# Patient Record
Sex: FEMALE | Race: WHITE
[De-identification: names, ages, dates, MRNs, and addresses within clinical notes are randomized per-mention and may not be internally consistent; named-entity substitution may affect disease eponyms.]

---

## 2017-12-31 ENCOUNTER — HOSPITAL ENCOUNTER (EMERGENCY)
Dept: HOSPITAL 92 - ERS | Age: 77
Discharge: HOME | End: 2017-12-31
Payer: MEDICARE

## 2017-12-31 DIAGNOSIS — J11.1: Primary | ICD-10-CM

## 2017-12-31 DIAGNOSIS — J44.9: ICD-10-CM

## 2017-12-31 DIAGNOSIS — I10: ICD-10-CM

## 2017-12-31 LAB
ALBUMIN SERPL BCG-MCNC: 3.8 G/DL (ref 3.4–4.8)
ALP SERPL-CCNC: 66 U/L (ref 40–150)
ALT SERPL W P-5'-P-CCNC: 7 U/L (ref 8–55)
ANION GAP SERPL CALC-SCNC: 16 MMOL/L (ref 10–20)
AST SERPL-CCNC: 15 U/L (ref 5–34)
BASOPHILS # BLD AUTO: 0 THOU/UL (ref 0–0.2)
BASOPHILS NFR BLD AUTO: 0.5 % (ref 0–1)
BILIRUB SERPL-MCNC: 0.4 MG/DL (ref 0.2–1.2)
BUN SERPL-MCNC: 29 MG/DL (ref 9.8–20.1)
CALCIUM SERPL-MCNC: 8.9 MG/DL (ref 7.8–10.44)
CHLORIDE SERPL-SCNC: 104 MMOL/L (ref 98–107)
CK MB SERPL-MCNC: 0.6 NG/ML (ref 0–6.6)
CK SERPL-CCNC: 182 U/L (ref 29–168)
CO2 SERPL-SCNC: 24 MMOL/L (ref 23–31)
CREAT CL PREDICTED SERPL C-G-VRATE: 0 ML/MIN (ref 70–130)
EOSINOPHIL # BLD AUTO: 0 THOU/UL (ref 0–0.7)
EOSINOPHIL NFR BLD AUTO: 0.3 % (ref 0–10)
GLOBULIN SER CALC-MCNC: 2.6 G/DL (ref 2.4–3.5)
GLUCOSE SERPL-MCNC: 105 MG/DL (ref 83–110)
HGB BLD-MCNC: 11.7 G/DL (ref 12–16)
LYMPHOCYTES # BLD: 0.8 THOU/UL (ref 1.2–3.4)
LYMPHOCYTES NFR BLD AUTO: 12.4 % (ref 21–51)
MCH RBC QN AUTO: 28.5 PG (ref 27–31)
MCV RBC AUTO: 89.9 FL (ref 81–99)
MONOCYTES # BLD AUTO: 0.8 THOU/UL (ref 0.11–0.59)
MONOCYTES NFR BLD AUTO: 11.6 % (ref 0–10)
NEUTROPHILS # BLD AUTO: 4.8 THOU/UL (ref 1.4–6.5)
NEUTROPHILS NFR BLD AUTO: 75.3 % (ref 42–75)
PLATELET # BLD AUTO: 156 THOU/UL (ref 130–400)
POTASSIUM SERPL-SCNC: 3.7 MMOL/L (ref 3.5–5.1)
RBC # BLD AUTO: 4.09 MILL/UL (ref 4.2–5.4)
SODIUM SERPL-SCNC: 140 MMOL/L (ref 136–145)
TROPONIN I SERPL DL<=0.01 NG/ML-MCNC: 0.02 NG/ML (ref ?–0.03)
WBC # BLD AUTO: 6.4 THOU/UL (ref 4.8–10.8)

## 2017-12-31 PROCEDURE — 94760 N-INVAS EAR/PLS OXIMETRY 1: CPT

## 2017-12-31 PROCEDURE — 36415 COLL VENOUS BLD VENIPUNCTURE: CPT

## 2017-12-31 PROCEDURE — 83605 ASSAY OF LACTIC ACID: CPT

## 2017-12-31 PROCEDURE — 94640 AIRWAY INHALATION TREATMENT: CPT

## 2017-12-31 PROCEDURE — 82550 ASSAY OF CK (CPK): CPT

## 2017-12-31 PROCEDURE — 71010: CPT

## 2017-12-31 PROCEDURE — 93005 ELECTROCARDIOGRAM TRACING: CPT

## 2017-12-31 PROCEDURE — 87040 BLOOD CULTURE FOR BACTERIA: CPT

## 2017-12-31 PROCEDURE — 85025 COMPLETE CBC W/AUTO DIFF WBC: CPT

## 2017-12-31 PROCEDURE — 84484 ASSAY OF TROPONIN QUANT: CPT

## 2017-12-31 PROCEDURE — 80053 COMPREHEN METABOLIC PANEL: CPT

## 2017-12-31 PROCEDURE — 82553 CREATINE MB FRACTION: CPT

## 2017-12-31 NOTE — RAD
PORTABLE CHEST 1 VIEW:

 

DATE: 12/31/17.

TIME: 10: 11 a.m.

 

HISTORY: 

Cough.

 

FINDINGS: 

Comparison is made with the exam of 2/28/17.

 

The heart is enlarged.  The aorta is tortuous.  No confluent areas of consolidation, pneumothorax, fr
ank pulmonary edema, or large effusions are seen.  Blunting of the costophrenic angles is stable.

 

POS: [facility]

## 2018-01-30 ENCOUNTER — HOSPITAL ENCOUNTER (OUTPATIENT)
Dept: HOSPITAL 92 - BICMAMMO | Age: 78
Discharge: HOME | End: 2018-01-30
Attending: FAMILY MEDICINE
Payer: MEDICARE

## 2018-01-30 DIAGNOSIS — Z13.820: Primary | ICD-10-CM

## 2018-01-30 PROCEDURE — 77080 DXA BONE DENSITY AXIAL: CPT

## 2018-08-28 ENCOUNTER — HOSPITAL ENCOUNTER (OUTPATIENT)
Dept: HOSPITAL 92 - RAD | Age: 78
Discharge: HOME | End: 2018-08-28
Attending: INTERNAL MEDICINE
Payer: MEDICARE

## 2018-08-28 DIAGNOSIS — R91.8: ICD-10-CM

## 2018-08-28 DIAGNOSIS — R06.00: Primary | ICD-10-CM

## 2018-08-28 PROCEDURE — 71046 X-RAY EXAM CHEST 2 VIEWS: CPT

## 2018-08-28 NOTE — RAD
CHEST 2 VIEWS:

 

Date:  08/28/18 

 

HISTORY:  

Dyspnea. 

 

COMPARISON:  

Radiograph from 2017. 

 

FINDINGS:

Lungs are hypoinflated with vascular crowding. There are some nodular densities in the right lower lo
be, which are new. Mild exaggerated thoracic kyphosis. 

 

IMPRESSION: 

Nodular opacities right lung base. This may be infectious in nature. Follow-up recommended. 

 

 

POS: [facility]

## 2018-10-18 ENCOUNTER — HOSPITAL ENCOUNTER (INPATIENT)
Dept: HOSPITAL 92 - SJJU | Age: 78
LOS: 28 days | Discharge: HOME | DRG: 470 | End: 2018-11-15
Attending: ORTHOPAEDIC SURGERY | Admitting: ORTHOPAEDIC SURGERY
Payer: MEDICARE

## 2018-10-18 VITALS — BODY MASS INDEX: 30.4 KG/M2

## 2018-10-18 DIAGNOSIS — Z79.82: ICD-10-CM

## 2018-10-18 DIAGNOSIS — Z79.51: ICD-10-CM

## 2018-10-18 DIAGNOSIS — M17.0: Primary | ICD-10-CM

## 2018-10-18 DIAGNOSIS — I25.10: ICD-10-CM

## 2018-10-18 DIAGNOSIS — E78.5: ICD-10-CM

## 2018-10-18 DIAGNOSIS — Z86.718: ICD-10-CM

## 2018-10-18 DIAGNOSIS — Z88.0: ICD-10-CM

## 2018-10-18 DIAGNOSIS — Z86.711: ICD-10-CM

## 2018-10-18 DIAGNOSIS — F32.9: ICD-10-CM

## 2018-10-18 DIAGNOSIS — Z95.5: ICD-10-CM

## 2018-10-18 DIAGNOSIS — J44.9: ICD-10-CM

## 2018-10-18 DIAGNOSIS — Z79.01: ICD-10-CM

## 2018-10-18 DIAGNOSIS — Z79.899: ICD-10-CM

## 2018-10-18 DIAGNOSIS — E66.9: ICD-10-CM

## 2018-10-18 DIAGNOSIS — D64.9: ICD-10-CM

## 2018-10-18 DIAGNOSIS — F41.9: ICD-10-CM

## 2018-10-18 PROCEDURE — C1776 JOINT DEVICE (IMPLANTABLE): HCPCS

## 2018-10-18 PROCEDURE — 87086 URINE CULTURE/COLONY COUNT: CPT

## 2018-10-18 PROCEDURE — 36415 COLL VENOUS BLD VENIPUNCTURE: CPT

## 2018-10-18 PROCEDURE — 85027 COMPLETE CBC AUTOMATED: CPT

## 2018-10-18 PROCEDURE — C1713 ANCHOR/SCREW BN/BN,TIS/BN: HCPCS

## 2018-10-18 PROCEDURE — S0020 INJECTION, BUPIVICAINE HYDRO: HCPCS

## 2018-11-06 ENCOUNTER — HOSPITAL ENCOUNTER (OUTPATIENT)
Dept: HOSPITAL 92 - LABBT | Age: 78
Discharge: HOME | End: 2018-11-06
Attending: ORTHOPAEDIC SURGERY
Payer: MEDICARE

## 2018-11-06 DIAGNOSIS — M17.11: ICD-10-CM

## 2018-11-06 DIAGNOSIS — Z01.812: Primary | ICD-10-CM

## 2018-11-06 LAB
ANION GAP SERPL CALC-SCNC: 12 MMOL/L (ref 10–20)
APTT PPP: 28.5 SEC (ref 22.9–36.1)
BACTERIA UR QL AUTO: (no result) HPF
BASOPHILS # BLD AUTO: 0 THOU/UL (ref 0–0.2)
BASOPHILS NFR BLD AUTO: 0.4 % (ref 0–1)
BUN SERPL-MCNC: 19 MG/DL (ref 9.8–20.1)
CALCIUM SERPL-MCNC: 9.2 MG/DL (ref 7.8–10.44)
CHLORIDE SERPL-SCNC: 104 MMOL/L (ref 98–107)
CO2 SERPL-SCNC: 27 MMOL/L (ref 23–31)
CREAT CL PREDICTED SERPL C-G-VRATE: 0 ML/MIN (ref 70–130)
CRYSTAL-AUWI FLAG: 0 (ref 0–15)
EOSINOPHIL # BLD AUTO: 0 THOU/UL (ref 0–0.7)
EOSINOPHIL NFR BLD AUTO: 0.6 % (ref 0–10)
GLUCOSE SERPL-MCNC: 92 MG/DL (ref 83–110)
HEV IGM SER QL: 6.6 (ref 0–7.99)
HGB BLD-MCNC: 11.9 G/DL (ref 12–16)
HYALINE CASTS #/AREA URNS LPF: (no result) LPF
INR PPP: 1
LYMPHOCYTES # BLD: 2.2 THOU/UL (ref 1.2–3.4)
LYMPHOCYTES NFR BLD AUTO: 34.3 % (ref 21–51)
MCH RBC QN AUTO: 27.8 PG (ref 27–31)
MCV RBC AUTO: 87.9 FL (ref 78–98)
MONOCYTES # BLD AUTO: 0.5 THOU/UL (ref 0.11–0.59)
MONOCYTES NFR BLD AUTO: 7.3 % (ref 0–10)
NEUTROPHILS # BLD AUTO: 3.7 THOU/UL (ref 1.4–6.5)
NEUTROPHILS NFR BLD AUTO: 57.4 % (ref 42–75)
PATHC CAST-AUWI FLAG: 0.29 (ref 0–2.49)
PLATELET # BLD AUTO: 205 THOU/UL (ref 130–400)
POTASSIUM SERPL-SCNC: 4.1 MMOL/L (ref 3.5–5.1)
PROTHROMBIN TIME: 13.1 SEC (ref 12–14.7)
RBC # BLD AUTO: 4.27 MILL/UL (ref 4.2–5.4)
RBC UR QL AUTO: (no result) HPF (ref 0–3)
SODIUM SERPL-SCNC: 139 MMOL/L (ref 136–145)
SP GR UR STRIP: 1.02 (ref 1–1.04)
SPERM-AUWI FLAG: 0 (ref 0–9.9)
WBC # BLD AUTO: 6.4 THOU/UL (ref 4.8–10.8)
WBC UR QL AUTO: (no result) HPF (ref 0–3)
YEAST-AUWI FLAG: 0 (ref 0–25)

## 2018-11-06 PROCEDURE — 85730 THROMBOPLASTIN TIME PARTIAL: CPT

## 2018-11-06 PROCEDURE — 87086 URINE CULTURE/COLONY COUNT: CPT

## 2018-11-06 PROCEDURE — 86901 BLOOD TYPING SEROLOGIC RH(D): CPT

## 2018-11-06 PROCEDURE — 86900 BLOOD TYPING SEROLOGIC ABO: CPT

## 2018-11-06 PROCEDURE — 80048 BASIC METABOLIC PNL TOTAL CA: CPT

## 2018-11-06 PROCEDURE — 85610 PROTHROMBIN TIME: CPT

## 2018-11-06 PROCEDURE — 85025 COMPLETE CBC W/AUTO DIFF WBC: CPT

## 2018-11-06 PROCEDURE — 81001 URINALYSIS AUTO W/SCOPE: CPT

## 2018-11-06 PROCEDURE — 86850 RBC ANTIBODY SCREEN: CPT

## 2018-11-08 NOTE — HP
HISTORY OF PRESENT ILLNESS:  The patient is a 78-year-old female with a long history of progressive p
roblems with degenerative arthritis of both knees, right symptomatic more than left.  She has had no 
injury.  She has had progressive problems despite restriction of activities, and several cortisone in
jections.  The pain is now interfering with day to day activities, including walking, getting dressed
 and sleeping.

 

PAST MEDICAL HISTORY:  The patient has history of COPD, followed by Dr. Verduzco and she has been seen
 and cleared for surgery by him.  She also has history of atherosclerotic cardiovascular disease and 
has 2 stents and has been seen and cleared for surgery by Dr. Fuller.

She has had a DVT and PE in the past, but is currently on no other anticoagulants other than aspirin.
  She also has DJD of her left knee which currently has been being managed conservatively.  She lives
 at [facility] and use a walker most of the time for ambulation.

 

CURRENT MEDICATIONS:  Calcium, multivitamins, Bleo inhaler, simvastatin, amlodipine, iron, venlafaxin
e, Uloric, Tylenol, Fiorinal, gabapentin.  

 

ALLERGIES:  She is allergic to PENICILLIN.

 

FAMILY HISTORY/SOCIAL HISTORY/REVIEW OF SYSTEMS:  Otherwise unremarkable.

 

PHYSICAL EXAMINATION:

GENERAL:  Reveals a healthy elderly female.

HEENT:  Unremarkable.

NECK:  Supple.

CHEST:  Clear.

HEART:  Regular rate and rhythm.

ABDOMEN:  Soft, nontender.

PELVIC/RECTAL/BREAST:  Exams are deferred.

EXTREMITIES:  Pertinent findings of the right knee.  There is puffiness, no definite effusion.  There
 is slight varus.  There is tenderness over the medial joint line.  Range of motion is 3-105 degrees.
  There is no instability.  There are palpable distal pulses, there is a right antalgic gait.

Neurovascular exam is intact. 

 

LABORATORY AND X-RAY FINDINGS:  X-rays reveal degenerative narrowing medially.  MRI scan reveals medi
al and lateral meniscal tears with significant DJD of the medial compartment and a 2 mm step off of t
he weightbearing surfaces with significant osteochondral defect.

 

IMPRESSION:

1.  Degenerative arthritis, both knees, right symptomatic more than left.

2.  History of atherosclerotic cardiovascular disease.

3.  History of deep venous thrombosis and pulmonary embolus.

4.  Chronic obstructive pulmonary disease. 

 

PLAN:  Right total knee replacement.  The nature of the surgery, length of recovery, and potential co
mplications such as infection, loss of motion, incomplete relief, delayed wound healing, neurovascula
r injury, thromboembolic phenomena, possible transfusion, and need for revision have been discussed i
n detail.

## 2018-11-12 PROCEDURE — 0SRC0J9 REPLACEMENT OF RIGHT KNEE JOINT WITH SYNTHETIC SUBSTITUTE, CEMENTED, OPEN APPROACH: ICD-10-PCS | Performed by: ORTHOPAEDIC SURGERY

## 2018-11-12 RX ADMIN — HYDROCODONE BITARTRATE AND ACETAMINOPHEN PRN TAB: 7.5; 325 TABLET ORAL at 14:26

## 2018-11-12 RX ADMIN — HYDROCODONE BITARTRATE AND ACETAMINOPHEN PRN TAB: 7.5; 325 TABLET ORAL at 18:37

## 2018-11-12 RX ADMIN — DOCUSATE SODIUM 50 MG AND SENNOSIDES 8.6 MG SCH TAB: 8.6; 5 TABLET, FILM COATED ORAL at 20:17

## 2018-11-12 NOTE — PDOC.PN
- Subjective


Encounter Start Date: 11/12/18


Encounter Start Time: 13:30


-: old records requested/rev





Patient seen and examined.  





pt is admitted for right knee replacement





consulted for medical management





- Objective


MAR Reviewed: Yes


Vital Signs & Weight: 


 Vital Signs (12 hours)











  Temp Pulse Resp BP Pulse Ox


 


 11/12/18 12:45  97.8 F  73  18  147/66 H  98








 Weight











Weight                         200 lb














Additional Labs: 





old labs and investigation reviewed in [facility]


Radiology Reviewed by me: Yes (knee xray reviewed)





Phys Exam





- Physical Examination


Constitutional: NAD


HEENT: PERRLA, moist MMs, sclera anicteric


Neck: no JVD, supple


Respiratory: no wheezing, no rales, no rhonchi


Cardiovascular: RRR, no significant murmur, no rub


Gastrointestinal: soft, non-tender, no distention, positive bowel sounds


Musculoskeletal: no edema, pulses present


right knee with dressing, nerve block +, leiva+


Neurological: non-focal, normal sensation, moves all 4 limbs


Lymphatic: no nodes


Psychiatric: normal affect, A&O x 3


Skin: no rash, normal turgor





Dx/Plan


(1) Status post total right knee replacement


Code(s): Z96.651 - PRESENCE OF RIGHT ARTIFICIAL KNEE JOINT   Status: Acute   





(2) Anxiety and depression


Code(s): F41.9 - ANXIETY DISORDER, UNSPECIFIED; F32.9 - MAJOR DEPRESSIVE 

DISORDER, SINGLE EPISODE, UNSPECIFIED   Status: Chronic   





(3) Dyslipidemia


Code(s): E78.5 - HYPERLIPIDEMIA, UNSPECIFIED   Status: Chronic   





(4) Hypertension


Code(s): I10 - ESSENTIAL (PRIMARY) HYPERTENSION   Status: Chronic   





(5) Migraine headache


Code(s): G43.909 - MIGRAINE, UNSP, NOT INTRACTABLE, WITHOUT STATUS MIGRAINOSUS 

  Status: Chronic   





(6) Obesity (BMI 30.0-34.9)


Code(s): E66.9 - OBESITY, UNSPECIFIED   Status: Chronic   





(7) H/O deep venous thrombosis


Code(s): Z86.718 - PERSONAL HISTORY OF OTHER VENOUS THROMBOSIS AND EMBOLISM   

Status: Chronic   





(8) Chronic anticoagulation


Code(s): Z79.01 - LONG TERM (CURRENT) USE OF ANTICOAGULANTS   Status: Chronic   





(9) COPD (chronic obstructive pulmonary disease)


Status: Chronic   





- Plan


cont current plan of care, plan discussed w/ family





* send urine culture


* home medication reconciled


* continue aspirin daily and xarelto daily


* medication reviewed as below


* symptomatic treatment


* PT/OT as per [facility] protocol


* pain controlled with pain meds


* discussed with family bedside


* pepcid for GI prophylaxis


* nerve block as per anesthesia


* code status- full code.








Review of Systems





- Review of Systems


Eyes: negative: Pain, Vision Change, Conjunctivae Inflammation, Eyelid 

Inflammation, Redness, Other


ENT: negative: Ear Pain, Ear Discharge, Nose Pain, Nose Discharge, Nose 

Congestion, Mouth Pain, Mouth Swelling, Throat Pain, Throat Swelling, Other


Respiratory: negative: Cough, Dry, Shortness of Breath, Hemoptysis, SOB with 

Excertion, Pleuritic Pain, Sputum, Wheezing


Cardiovascular: negative: chest pain, palpitations, orthopnea, paroxysmal 

nocturnal dyspnea, edema, light headedness, other


Gastrointestinal: negative: Nausea, Vomiting, Abdominal Pain, Diarrhea, 

Constipation, Melena, Hematochezia, Other


Genitourinary: negative: Dysuria, Frequency, Incontinence, Hematuria, Retention

, Other


Musculoskeletal: negative: Neck Pain, Shoulder Pain, Arm Pain, Back Pain, Hand 

Pain, Leg Pain, Foot Pain, Other


Skin: negative: Rash, Lesions, Carlitos, Bruising, Other





- Medications/Allergies


Allergies/Adverse Reactions: 


 Allergies











Allergy/AdvReac Type Severity Reaction Status Date / Time


 


azithromycin Allergy   Verified 11/06/18 13:34


 


Penicillins Allergy   Verified 11/06/18 13:34











Medications: 


 Current Medications





Acetaminophen (Tylenol)  650 mg PO Q4H PRN


   PRN Reason: HA/ T > 101F; Mild Pain (1-3)


Hydrocodone Bitart/Acetaminophen (Norco 7.5/325)  1 tab PO Q4H PRN


   PRN Reason: Mild Pain (1-3)


Hydrocodone Bitart/Acetaminophen (Norco 7.5/325)  2 tab PO Q4H PRN


   PRN Reason: Moderate Pain (4-6)


Aspirin (Ecotrin)  81 mg PO DAILY [facility]


Aspirin (Ecotrin)  81 mg PO NOW [facility]


   Stop: 11/12/18 14:45


Candesartan Cilexetil (Atacand)  4 mg PO DAILY [facility]


Candesartan Cilexetil (Atacand)  4 mg PO NOW [facility]


   Stop: 11/12/18 15:00


Carvedilol (Coreg)  3.125 mg PO BID [facility]


Carvedilol (Coreg)  3.125 mg PO NOW [facility]


   Stop: 11/12/18 15:00


Clotrimazole (Lotrimin 1% Cream)  0 gm TOP BID PRN


   PRN Reason: Rash/Topical Irritation


Diphenhydramine HCl (Benadryl)  25 mg PO Q6H PRN


   PRN Reason: Itching


Fentanyl (Sublimaze)  50 mcg SLOW IVP Q30MIN PRN


   PRN Reason: Moderate Pain (4-6)


Fentanyl (Sublimaze)  100 mcg SLOW IVP Q1H PRN


   PRN Reason: Severe Pain (7-10)


Ferrous Gluconate (Fergon)  324 mg PO BID [facility]


Ferrous Gluconate (Fergon)  324 mg PO NOW [facility]


   Stop: 11/12/18 15:00


Fluticasone Propionate (Flonase Nasal Spray)  0 gm NASAL DAILY [facility]


Fluticasone Propionate (Flonase Nasal [place])  0 gm NASAL NOW [facility]


   Stop: 11/12/18 15:00


Vancomycin HCl 1.5 gm/ Sodium (Chloride)  300 mls @ 200 mls/hr IVPB ONCALL-OR 

[facility]


   Stop: 11/12/18 15:00


Cefazolin Sodium/Dextrose 2 gm (/ Device)  50 mls @ 100 mls/hr IVPB ONCALL-OR 

[facility]


   Stop: 11/12/18 15:00


Ropivacaine 250 ml/ Device  250 mls @ 10 mls/hr NERVE BLCK INF [facility]


Levofloxacin 500 mg/ Device  100 mls @ 100 mls/hr IVPB 0900 [facility]


   Stop: 11/13/18 09:59


Sodium Chloride (Normal Saline 0.9%)  1,000 mls @ 100 mls/hr IV .Q10H [facility]


Vancomycin HCl 1.5 gm/ Sodium (Chloride)  300 mls @ 200 mls/hr IVPB 2000 [facility]


   Stop: 11/12/18 23:59


Iron/Minerals/Multivitamins (Theragran M)  1 tab PO DAILY [facility]


Iron/Minerals/Multivitamins (Theragran M)  1 tab PO NOW [facility]


   Stop: 11/12/18 15:00


Ketorolac Tromethamine (Toradol)  15 mg IVP Q8HR [facility]


   Stop: 11/14/18 14:01


Loratadine (Claritin)  10 mg PO DAILY [facility]


Loratadine (Claritin)  10 mg PO NOW [facility]


   Stop: 11/12/18 15:00


Meclizine HCl (Antivert)  25 mg PO QID PRN


   PRN Reason: Dizziness


Nystatin (Mycostatin Powder)  0 gm TOP BID PRN


   PRN Reason: Rash/Topical Irritation


Ondansetron HCl (Zofran)  4 mg IVP Q6H PRN


   PRN Reason: Nausea/Vomiting


Promethazine HCl (Phenergan)  12.5 mg IM Q4H PRN


   PRN Reason: Nausea


Promethazine HCl (Phenergan)  12.5 mg SLOW IVP Q4H PRN


   PRN Reason: Nausea/Vomiting


Rivaroxaban (Xarelto)  10 mg PO DAILY [facility]


Senna/Docusate Sodium (Senokot S)  2 tab PO BID [facility]


Senna/Docusate Sodium (Senokot S)  2 tab PO NOW [facility]


   Stop: 11/12/18 15:00


Simvastatin (Zocor)  40 mg PO DAILY [facility]


Simvastatin (Zocor)  40 mg PO NOW [facility]


   Stop: 11/12/18 15:00


Sodium Chloride (Flush - Normal Saline)  10 ml IVF PRN PRN


   PRN Reason: Saline Flush


Tramadol HCl (Ultram)  100 mg PO Q6H PRN


   PRN Reason: Moderate to Severe Pain (6-10)


Tramadol HCl (Ultram)  50 mg PO Q6H PRN


   PRN Reason: Mild-Moderate Pain (1-5)


Venlafaxine HCl (Effexor Xr)  37.5 mg PO DAILY [facility]


Venlafaxine HCl (Effexor Xr)  37.5 mg PO NOW [facility]


   Stop: 11/12/18 15:00


Zolpidem Tartrate (Ambien)  5 mg PO HSPRN PRN


   PRN Reason: Insomnia

## 2018-11-12 NOTE — OP
DATE OF PROCEDURE:  11/12/2018

 

SURGEON:  Jatin Galan M.D.

 

ASSISTANT:  Gila Becerra PA-C.

 

ANESTHESIA:  General plus adductor canal and sciatic nerve blocks.

 

PREOPERATIVE DIAGNOSIS:  Degenerative arthritis, right knee.

 

POSTOPERATIVE DIAGNOSIS:  Degenerative arthritis, right knee.

 

PROCEDURES:  Right total knee replacement with computer-assisted navigation with cemented [place] components (#5 femoral component, #4 tibial baseplate with 11 mm CS plastic insert and all julieta
stic A29 patellar component).

 

NARRATIVE REPORT:  After satisfactory anesthesia was induced in supine position, sequential compressi
on device was placed on the non-operative leg throughout the procedure.  The right leg was then prepp
ed and draped in the routine sterile fashion.  The right leg was elevated and exsanguinated with an E
smarch bandage and the tourniquet inflated to 300 mmHg.  A gently curved medial parapatellar incision
 was made and carried down to subcutaneous tissues and bleeding points were controlled with Bovie cau
carl.  A medial parapatellar arthrotomy was performed.  The patella was dislocated laterally and port
ions of the fat pad were excised for exposure.  There was significant tricompartmental degenerative a
rthritis with areas of cartilage degeneration and small areas of exposed bone.  Osteophytes and menis
darryl remnants removed.  Using the [employer] pinless navigation system and the appropriate guides, the di
stal femoral and proximal tibial articular surfaces were excised with an oscillating saw to accept th
e trial components.  It was felt that #5 femoral component and #4 tibial baseplate with 11 mm CS plas
tic insert gave appropriate size, fit and stability.  The patellar articular surface was excised to a
ccept an all plastic A29 patellar component.  There was good range of motion, good stability and good
 patellar tracking.  The trial components were removed.  The knee was copiously irrigated with pulsat
ile lavage.  The bony surfaces were thoroughly cleaned and dried.  The permanent components were then
 cemented in a single stage using 1 package of cement premixed with 1 gram of tobramycin powder.  Exc
ess cement was removed.  There was again good fit and stability of the components.  The knee was agai
n copiously irrigated.  The skin was infiltrated with 30 mL of 0.25% Marcaine with epinephrine.  The 
medial retinaculum and quadriceps mechanism was closed with interrupted #2 Vicryl and a running #2 Qu
ill.  Subcutaneous tissues were closed with running 0 Quill suture and the skin was closed with runni
ng subcuticular 3-0 Monoderm and SurgiSeal skin adhesive.  A sterile bulky compressive dressing was a
pplied.  The tourniquet was deflated after 74 minutes.  The foot promptly pinked up and sequential co
mpression device was placed on the operated leg.  She was awakened and taken to recovery room in stab
le condition.  There were no apparent intraoperative complications.  The estimated blood loss was les
s than 100 mL.

## 2018-11-12 NOTE — RAD
RIGHT KNEE RADIOGRAPHS TWO VIEWS:

 

Date: 11-12-18 

 

Provided Clinical History: 

Post op. 

 

FINDINGS: 

No comparison. Post-operative changes of right total knee arthroplasty are demonstrated. Post-operati
ve soft tissue gas is seen. No evidence for an acute abnormality. 

 

 

IMPRESSION: 

Post operative change as above. 

 

POS: [facility]

## 2018-11-13 LAB
HGB BLD-MCNC: 10.1 G/DL (ref 12–16)
MCH RBC QN AUTO: 27.4 PG (ref 27–31)
MCV RBC AUTO: 89.9 FL (ref 78–98)
PLATELET # BLD AUTO: 185 THOU/UL (ref 130–400)
RBC # BLD AUTO: 3.67 MILL/UL (ref 4.2–5.4)
WBC # BLD AUTO: 7.7 THOU/UL (ref 4.8–10.8)

## 2018-11-13 RX ADMIN — ROPIVACAINE HYDROCHLORIDE SCH MLS: 2 INJECTION, SOLUTION EPIDURAL; INFILTRATION at 12:35

## 2018-11-13 RX ADMIN — DOCUSATE SODIUM 50 MG AND SENNOSIDES 8.6 MG SCH: 8.6; 5 TABLET, FILM COATED ORAL at 21:25

## 2018-11-13 RX ADMIN — HYDROCODONE BITARTRATE AND ACETAMINOPHEN PRN TAB: 7.5; 325 TABLET ORAL at 08:22

## 2018-11-13 RX ADMIN — Medication PRN ML: at 14:13

## 2018-11-13 RX ADMIN — MULTIPLE VITAMINS W/ MINERALS TAB SCH TAB: TAB at 08:27

## 2018-11-13 RX ADMIN — Medication PRN ML: at 21:27

## 2018-11-13 RX ADMIN — HYDROCODONE BITARTRATE AND ACETAMINOPHEN PRN TAB: 7.5; 325 TABLET ORAL at 12:33

## 2018-11-13 RX ADMIN — ASPIRIN SCH MG: 81 TABLET ORAL at 08:27

## 2018-11-13 RX ADMIN — DOCUSATE SODIUM 50 MG AND SENNOSIDES 8.6 MG SCH: 8.6; 5 TABLET, FILM COATED ORAL at 08:35

## 2018-11-13 NOTE — PDOC.PN
- Subjective


Encounter Start Date: 11/13/18


Encounter Start Time: 07:40





Patient seen and examined. No new complaints. No overnight events





- Objective


Resuscitation Status: 


 











Resuscitation Status           FULL:Full Resuscitation














MAR Reviewed: Yes


Vital Signs & Weight: 


 Vital Signs (12 hours)











  Temp Pulse Resp BP Pulse Ox


 


 11/13/18 08:33  98.8 F  80  15  152/82 H  92 L


 


 11/13/18 04:31  97.8 F  70  20  117/69  94 L


 


 11/12/18 23:37  98 F  66  20  107/66  95








 Weight











Weight                         200 lb














I&O: 


 











 11/12/18 11/13/18 11/14/18





 06:59 06:59 06:59


 


Intake Total  1341.0 1560


 


Output Total  1100 900


 


Balance  241.0 660











Result Diagrams: 


 11/13/18 04:29








Phys Exam





- Physical Examination


Constitutional: NAD


HEENT: PERRLA, moist MMs, sclera anicteric


Neck: no JVD, supple


Respiratory: no wheezing, no rales, no rhonchi


Cardiovascular: RRR, no significant murmur, no rub


Gastrointestinal: soft, non-tender, no distention, positive bowel sounds


right knee with dressing, nerve block+


Musculoskeletal: no edema, pulses present


Neurological: non-focal, normal sensation, moves all 4 limbs


Psychiatric: normal affect, A&O x 3


Skin: no rash, normal turgor





Dx/Plan


(1) Status post total right knee replacement


Code(s): Z96.651 - PRESENCE OF RIGHT ARTIFICIAL KNEE JOINT   Status: Acute   





(2) Anxiety and depression


Code(s): F41.9 - ANXIETY DISORDER, UNSPECIFIED; F32.9 - MAJOR DEPRESSIVE 

DISORDER, SINGLE EPISODE, UNSPECIFIED   Status: Chronic   





(3) Dyslipidemia


Code(s): E78.5 - HYPERLIPIDEMIA, UNSPECIFIED   Status: Chronic   





(4) Hypertension


Code(s): I10 - ESSENTIAL (PRIMARY) HYPERTENSION   Status: Chronic   





(5) Migraine headache


Code(s): G43.909 - MIGRAINE, UNSP, NOT INTRACTABLE, WITHOUT STATUS MIGRAINOSUS 

  Status: Chronic   





(6) Obesity (BMI 30.0-34.9)


Code(s): E66.9 - OBESITY, UNSPECIFIED   Status: Chronic   





(7) H/O deep venous thrombosis


Code(s): Z86.718 - PERSONAL HISTORY OF OTHER VENOUS THROMBOSIS AND EMBOLISM   

Status: Chronic   





(8) Chronic anticoagulation


Code(s): Z79.01 - LONG TERM (CURRENT) USE OF ANTICOAGULANTS   Status: Chronic   





(9) COPD (chronic obstructive pulmonary disease)


Status: Chronic   





(10) Anemia, normocytic normochromic


Code(s): D64.9 - ANEMIA, UNSPECIFIED   Status: Chronic   





- Plan


cont current plan of care, PT/OT





* medically stable


* medication reviewed as below


* symptomatic treatment


* continue PT/OT


* nerve block as per anesthesia





Review of Systems





- Review of Systems


ENT: negative: Ear Pain, Ear Discharge, Nose Pain, Nose Discharge, Nose 

Congestion, Mouth Pain, Mouth Swelling, Throat Pain, Throat Swelling, Other


Respiratory: negative: Cough, Dry, Shortness of Breath, Hemoptysis, SOB with 

Excertion, Pleuritic Pain, Sputum, Wheezing


Cardiovascular: negative: chest pain, palpitations, orthopnea, paroxysmal 

nocturnal dyspnea, edema, light headedness, other


Gastrointestinal: negative: Nausea, Vomiting, Abdominal Pain, Diarrhea, 

Constipation, Melena, Hematochezia, Other


Genitourinary: negative: Dysuria, Frequency, Incontinence, Hematuria, Retention

, Other


Musculoskeletal: negative: Neck Pain, Shoulder Pain, Arm Pain, Back Pain, Hand 

Pain, Leg Pain, Foot Pain, Other


Skin: negative: Rash, Lesions, Carlitos, Bruising, Other





- Medications/Allergies


Allergies/Adverse Reactions: 


 Allergies











Allergy/AdvReac Type Severity Reaction Status Date / Time


 


azithromycin Allergy   Verified 11/06/18 13:34


 


Penicillins Allergy   Verified 11/06/18 13:34











Medications: 


 Current Medications





Acetaminophen (Tylenol)  650 mg PO Q4H PRN


   PRN Reason: HA/ T > 101F; Mild Pain (1-3)


Hydrocodone Bitart/Acetaminophen (Norco 7.5/325)  1 tab PO Q4H PRN


   PRN Reason: Mild Pain (1-3)


Hydrocodone Bitart/Acetaminophen (Norco 7.5/325)  2 tab PO Q4H PRN


   PRN Reason: Moderate Pain (4-6)


   Last Admin: 11/13/18 08:22 Dose:  2 tab


Albuterol/Ipratropium (Duoneb)  3 ml NEB [id number] PRN


   PRN Reason: SOB &/or Wheezing


Artificial Tears (Tears Naturale)  2 drop EA EYE PRN PRN


   PRN Reason: Dry Eyes


Aspirin (Ecotrin)  81 mg PO DAILY [facility]


   Last Admin: 11/13/18 08:27 Dose:  81 mg


Candesartan Cilexetil (Atacand)  4 mg PO DAILY [facility]


Carvedilol (Coreg)  3.125 mg PO BID [facility]


   Last Admin: 11/13/18 08:27 Dose:  3.125 mg


Clotrimazole (Lotrimin 1% Cream)  0 gm TOP BID PRN


   PRN Reason: Rash/Topical Irritation


Diphenhydramine HCl (Benadryl)  25 mg PO Q6H PRN


   PRN Reason: Itching


Famotidine (Pepcid)  20 mg PO BID [facility]


   Last Admin: 11/13/18 08:27 Dose:  20 mg


Fentanyl (Sublimaze)  50 mcg SLOW IVP Q30MIN PRN


   PRN Reason: Moderate Pain (4-6)


Fentanyl (Sublimaze)  100 mcg SLOW IVP Q1H PRN


   PRN Reason: Severe Pain (7-10)


Ferrous Gluconate (Fergon)  324 mg PO BID [facility]


   Last Admin: 11/13/18 08:29 Dose:  324 mg


Fluticasone Propionate (Flonase Nasal Spray)  0 gm NASAL DAILY [facility]


   Last Admin: 11/13/18 08:29 Dose:  1 spr


Guaifenesin (Robitussin Sf)  200 mg PO Q4H PRN


   PRN Reason: Cough


Hydralazine HCl (Apresoline)  10 mg SLOW IVP Q4H PRN


   PRN Reason: SBP Greater Than 170


Ropivacaine 250 ml/ Device  250 mls @ 10 mls/hr NERVE BLCK INF [facility]


Levofloxacin 500 mg/ Device  100 mls @ 100 mls/hr IVPB 0900 [facility]


   Stop: 11/13/18 09:59


   Last Admin: 11/13/18 08:25 Dose:  100 mls


Sodium Chloride (Normal Saline 0.9%)  1,000 mls @ 100 mls/hr IV .Q10H [facility]


   Last Admin: 11/13/18 08:34 Dose:  Not Given


Iron/Minerals/Multivitamins (Theragran M)  1 tab PO DAILY [facility]


   Last Admin: 11/13/18 08:27 Dose:  1 tab


Ketorolac Tromethamine (Toradol)  15 mg IVP Q8HR [facility]


   Stop: 11/14/18 14:01


   Last Admin: 11/13/18 06:43 Dose:  15 mg


Loperamide HCl (Imodium)  2 mg PO PRN PRN


   PRN Reason: Diarrhea/Loose Stools


Loratadine (Claritin)  10 mg PO DAILY [facility]


   Last Admin: 11/13/18 08:35 Dose:  Not Given


Meclizine HCl (Antivert)  25 mg PO QID PRN


   PRN Reason: Dizziness


   Last Admin: 11/13/18 09:00 Dose:  25 mg


Mineral Oil/White Petrolatum (Eucerin Cream)  0 gm TOP BIDPRN PRN


   PRN Reason: Dry Skin


Nystatin (Mycostatin Powder)  0 gm TOP BID PRN


   PRN Reason: Rash/Topical Irritation


Ondansetron HCl (Zofran)  4 mg IVP Q6H PRN


   PRN Reason: Nausea/Vomiting


Promethazine HCl (Phenergan)  12.5 mg IM Q4H PRN


   PRN Reason: Nausea


Promethazine HCl (Phenergan)  12.5 mg SLOW IVP Q4H PRN


   PRN Reason: Nausea/Vomiting


Rivaroxaban (Xarelto)  10 mg PO DAILY [facility]


   Last Admin: 11/13/18 08:26 Dose:  10 mg


Senna/Docusate Sodium (Senokot S)  2 tab PO BID [facility]


   Last Admin: 11/13/18 08:35 Dose:  Not Given


Simvastatin (Zocor)  40 mg PO [facility]


Sodium Chloride (Flush - Normal Saline)  10 ml IVF PRN PRN


   PRN Reason: Saline Flush


Throat Lozenges (Cepastat Lozenges)  1 raghu PO Q2H PRN


   PRN Reason: Sore Throat


Tramadol HCl (Ultram)  100 mg PO Q6H PRN


   PRN Reason: Moderate to Severe Pain (6-10)


Tramadol HCl (Ultram)  50 mg PO Q6H PRN


   PRN Reason: Mild-Moderate Pain (1-5)


Venlafaxine HCl (Effexor Xr)  37.5 mg PO [facility]


   Last Admin: 11/12/18 20:17 Dose:  37.5 mg


Zolpidem Tartrate (Ambien)  5 mg PO HSPRN PRN


   PRN Reason: Insomnia

## 2018-11-14 RX ADMIN — Medication PRN ML: at 06:53

## 2018-11-14 RX ADMIN — ASPIRIN SCH MG: 81 TABLET ORAL at 09:09

## 2018-11-14 RX ADMIN — DOCUSATE SODIUM 50 MG AND SENNOSIDES 8.6 MG SCH: 8.6; 5 TABLET, FILM COATED ORAL at 20:36

## 2018-11-14 RX ADMIN — ROPIVACAINE HYDROCHLORIDE SCH MLS: 2 INJECTION, SOLUTION EPIDURAL; INFILTRATION at 14:42

## 2018-11-14 RX ADMIN — MULTIPLE VITAMINS W/ MINERALS TAB SCH TAB: TAB at 09:08

## 2018-11-14 RX ADMIN — DOCUSATE SODIUM 50 MG AND SENNOSIDES 8.6 MG SCH TAB: 8.6; 5 TABLET, FILM COATED ORAL at 09:08

## 2018-11-14 RX ADMIN — HYDROCODONE BITARTRATE AND ACETAMINOPHEN PRN TAB: 7.5; 325 TABLET ORAL at 14:59

## 2018-11-14 RX ADMIN — HYDROCODONE BITARTRATE AND ACETAMINOPHEN PRN TAB: 7.5; 325 TABLET ORAL at 20:37

## 2018-11-14 NOTE — PDOC.PN
- Subjective


Encounter Start Date: 11/14/18


Encounter Start Time: 08:20





Patient seen and examined. No new complaints. No overnight events





- Objective


Resuscitation Status: 


 











Resuscitation Status           FULL:Full Resuscitation














MAR Reviewed: Yes


Vital Signs & Weight: 


 Vital Signs (12 hours)











  Temp Pulse Resp BP Pulse Ox


 


 11/14/18 07:15  98.8 F  89  16  154/76 H  92 L


 


 11/14/18 04:56  98.7 F  88  19  163/84 H  93 L


 


 11/14/18 00:34  98.3 F  87  17  135/77  93 L








 Weight











Admit Weight                   200 lb


 


Weight                         200 lb














I&O: 


 











 11/13/18 11/14/18 11/15/18





 06:59 06:59 06:59


 


Intake Total 1341.0 2610.5 960


 


Output Total 1100 1700 1925


 


Balance 241.0 910.5 -965











Result Diagrams: 


 11/13/18 04:29








Phys Exam





- Physical Examination


Constitutional: NAD


HEENT: PERRLA, moist MMs, sclera anicteric


Neck: no JVD, supple


Respiratory: no wheezing, no rales, no rhonchi


Cardiovascular: RRR, no significant murmur, no rub


Gastrointestinal: soft, non-tender, no distention, positive bowel sounds


Musculoskeletal: no edema, pulses present


rightt knee with dressing, nerve block+, leiva+


Neurological: non-focal, normal sensation


Psychiatric: normal affect, A&O x 3


Skin: no rash, normal turgor





Dx/Plan


(1) Status post total right knee replacement


Code(s): Z96.651 - PRESENCE OF RIGHT ARTIFICIAL KNEE JOINT   Status: Acute   





(2) Anxiety and depression


Code(s): F41.9 - ANXIETY DISORDER, UNSPECIFIED; F32.9 - MAJOR DEPRESSIVE 

DISORDER, SINGLE EPISODE, UNSPECIFIED   Status: Chronic   





(3) Dyslipidemia


Code(s): E78.5 - HYPERLIPIDEMIA, UNSPECIFIED   Status: Chronic   





(4) Hypertension


Code(s): I10 - ESSENTIAL (PRIMARY) HYPERTENSION   Status: Chronic   





(5) Migraine headache


Code(s): G43.909 - MIGRAINE, UNSP, NOT INTRACTABLE, WITHOUT STATUS MIGRAINOSUS 

  Status: Chronic   





(6) Obesity (BMI 30.0-34.9)


Code(s): E66.9 - OBESITY, UNSPECIFIED   Status: Chronic   





(7) H/O deep venous thrombosis


Code(s): Z86.718 - PERSONAL HISTORY OF OTHER VENOUS THROMBOSIS AND EMBOLISM   

Status: Chronic   





(8) Chronic anticoagulation


Code(s): Z79.01 - LONG TERM (CURRENT) USE OF ANTICOAGULANTS   Status: Chronic   





(9) COPD (chronic obstructive pulmonary disease)


Status: Chronic   





(10) Anemia, normocytic normochromic


Code(s): D64.9 - ANEMIA, UNSPECIFIED   Status: Chronic   





- Plan


cont current plan of care, PT/OT, 





* medically stable with current treatment


* medication reviewed as below


* symptomatic treatment


* may need rehab


* pain controlled.








Review of Systems





- Review of Systems


ENT: negative: Ear Pain, Ear Discharge, Nose Pain, Nose Discharge, Nose 

Congestion, Mouth Pain, Mouth Swelling, Throat Pain, Throat Swelling, Other


Respiratory: negative: Cough, Dry, Shortness of Breath, Hemoptysis, SOB with 

Excertion, Pleuritic Pain, Sputum, Wheezing


Cardiovascular: negative: chest pain, palpitations, orthopnea, paroxysmal 

nocturnal dyspnea, edema, light headedness, other


Gastrointestinal: negative: Nausea, Vomiting, Abdominal Pain, Diarrhea, 

Constipation, Melena, Hematochezia, Other


Genitourinary: negative: Dysuria, Frequency, Incontinence, Hematuria, Retention

, Other


Musculoskeletal: negative: Neck Pain, Shoulder Pain, Arm Pain, Back Pain, Hand 

Pain, Leg Pain, Foot Pain, Other


Skin: negative: Rash, Lesions, Carlitos, Bruising, Other





- Medications/Allergies


Allergies/Adverse Reactions: 


 Allergies











Allergy/AdvReac Type Severity Reaction Status Date / Time


 


azithromycin Allergy   Verified 11/06/18 13:34


 


Penicillins Allergy   Verified 11/06/18 13:34











Medications: 


 Current Medications





Acetaminophen (Tylenol)  650 mg PO Q4H PRN


   PRN Reason: HA/ T > 101F; Mild Pain (1-3)


Hydrocodone Bitart/Acetaminophen (Norco 7.5/325)  1 tab PO Q4H PRN


   PRN Reason: Mild Pain (1-3)


Hydrocodone Bitart/Acetaminophen (Norco 7.5/325)  2 tab PO Q4H PRN


   PRN Reason: Moderate Pain (4-6)


   Last Admin: 11/13/18 12:33 Dose:  2 tab


Albuterol/Ipratropium (Duoneb)  3 ml NEB [id number] PRN


   PRN Reason: SOB &/or Wheezing


Artificial Tears (Tears Naturale)  2 drop EA EYE PRN PRN


   PRN Reason: Dry Eyes


Aspirin (Ecotrin)  81 mg PO DAILY [facility]


   Last Admin: 11/14/18 09:09 Dose:  81 mg


Candesartan Cilexetil (Atacand)  4 mg PO DAILY [facility]


   Last Admin: 11/14/18 09:10 Dose:  4 mg


Carvedilol (Coreg)  3.125 mg PO BID [facility]


   Last Admin: 11/14/18 09:09 Dose:  3.125 mg


Clotrimazole (Lotrimin 1% Cream)  0 gm TOP BID PRN


   PRN Reason: Rash/Topical Irritation


Diphenhydramine HCl (Benadryl)  25 mg PO Q6H PRN


   PRN Reason: Itching


Famotidine (Pepcid)  20 mg PO BID [facility]


   Last Admin: 11/14/18 09:09 Dose:  20 mg


Fentanyl (Sublimaze)  50 mcg SLOW IVP Q30MIN PRN


   PRN Reason: Moderate Pain (4-6)


Fentanyl (Sublimaze)  100 mcg SLOW IVP Q1H PRN


   PRN Reason: Severe Pain (7-10)


Ferrous Gluconate (Fergon)  324 mg PO BID [facility]


   Last Admin: 11/14/18 09:08 Dose:  324 mg


Fluticasone Propionate (Flonase Nasal Spray)  0 gm NASAL DAILY [facility]


   Last Admin: 11/14/18 09:06 Dose:  1 spr


Guaifenesin (Robitussin Sf)  200 mg PO Q4H PRN


   PRN Reason: Cough


Hydralazine HCl (Apresoline)  10 mg SLOW IVP Q4H PRN


   PRN Reason: SBP Greater Than 170


Ropivacaine 250 ml/ Device  250 mls @ 10 mls/hr NERVE BLCK INF [facility]


   Last Admin: 11/13/18 12:35 Dose:  250 mls


Sodium Chloride (Normal Saline 0.9%)  1,000 mls @ 100 mls/hr IV .Q10H [facility]


   Last Admin: 11/14/18 03:45 Dose:  Not Given


Iron/Minerals/Multivitamins (Theragran M)  1 tab PO DAILY [facility]


   Last Admin: 11/14/18 09:08 Dose:  1 tab


Ketorolac Tromethamine (Toradol)  15 mg IVP Q8HR [facility]


   Stop: 11/14/18 14:01


   Last Admin: 11/14/18 06:52 Dose:  15 mg


Loperamide HCl (Imodium)  2 mg PO PRN PRN


   PRN Reason: Diarrhea/Loose Stools


Loratadine (Claritin)  10 mg PO DAILY [facility]


   Last Admin: 11/14/18 09:23 Dose:  Not Given


Meclizine HCl (Antivert)  25 mg PO QID PRN


   PRN Reason: Dizziness


   Last Admin: 11/13/18 09:00 Dose:  25 mg


Mineral Oil/White Petrolatum (Eucerin Cream)  0 gm TOP BIDPRN PRN


   PRN Reason: Dry Skin


Nystatin (Mycostatin Powder)  0 gm TOP BID PRN


   PRN Reason: Rash/Topical Irritation


Ondansetron HCl (Zofran)  4 mg IVP Q6H PRN


   PRN Reason: Nausea/Vomiting


   Last Admin: 11/13/18 14:10 Dose:  4 mg


Promethazine HCl (Phenergan)  12.5 mg IM Q4H PRN


   PRN Reason: Nausea


Promethazine HCl (Phenergan)  12.5 mg SLOW IVP Q4H PRN


   PRN Reason: Nausea/Vomiting


Rivaroxaban (Xarelto)  10 mg PO DAILY [facility]


   Last Admin: 11/14/18 09:07 Dose:  10 mg


Senna/Docusate Sodium (Senokot S)  2 tab PO BID [facility]


   Last Admin: 11/14/18 09:08 Dose:  2 tab


Simvastatin (Zocor)  40 mg PO HS [facility]


   Last Admin: 11/13/18 21:25 Dose:  40 mg


Sodium Chloride (Flush - Normal Saline)  10 ml IVF PRN PRN


   PRN Reason: Saline Flush


   Last Admin: 11/14/18 06:53 Dose:  10 ml


Throat Lozenges (Cepastat Lozenges)  1 raghu PO Q2H PRN


   PRN Reason: Sore Throat


Tramadol HCl (Ultram)  100 mg PO Q6H PRN


   PRN Reason: Moderate to Severe Pain (6-10)


Tramadol HCl (Ultram)  50 mg PO Q6H PRN


   PRN Reason: Mild-Moderate Pain (1-5)


Venlafaxine HCl (Effexor Xr)  37.5 mg PO HS [facility]


   Last Admin: 11/13/18 21:26 Dose:  37.5 mg


Zolpidem Tartrate (Ambien)  5 mg PO HSPRN PRN


   PRN Reason: Insomnia

## 2018-11-15 VITALS — DIASTOLIC BLOOD PRESSURE: 76 MMHG | SYSTOLIC BLOOD PRESSURE: 136 MMHG

## 2018-11-15 VITALS — TEMPERATURE: 97.8 F

## 2018-11-15 RX ADMIN — DOCUSATE SODIUM 50 MG AND SENNOSIDES 8.6 MG SCH TAB: 8.6; 5 TABLET, FILM COATED ORAL at 08:31

## 2018-11-15 RX ADMIN — ASPIRIN SCH MG: 81 TABLET ORAL at 08:35

## 2018-11-15 RX ADMIN — MULTIPLE VITAMINS W/ MINERALS TAB SCH TAB: TAB at 08:33

## 2018-11-15 RX ADMIN — HYDROCODONE BITARTRATE AND ACETAMINOPHEN PRN TAB: 7.5; 325 TABLET ORAL at 09:39

## 2018-11-15 RX ADMIN — HYDROCODONE BITARTRATE AND ACETAMINOPHEN PRN TAB: 7.5; 325 TABLET ORAL at 06:13

## 2018-11-15 NOTE — DIS
DATE OF ADMISSION:  11/12/2018

 

DATE OF DISCHARGE:  11/15/2018

 

PRIMARY CARE PHYSICIAN:  [facility] call.

 

DISCHARGE DISPOSITION:  Skilled nursing home.

 

PRIMARY DISCHARGE DIAGNOSIS:  Status post right total knee replacement.

 

SECONDARY DISCHARGE DIAGNOSES:  Obesity with body mass index 30, migraine 
headache, hypertension, history of DVT, dyslipidemia, COPD, chronic 
anticoagulation, anxiety, depression, normocytic normochromic anemia, 
osteoarthritis.

 

PRIMARY PROCEDURE/OPERATION:  Right total knee replacement by Dr. Galan.

 

RADIOLOGICAL INVESTIGATION:  Knee x-ray.

 

SIGNIFICANT LABORATORY DATA:  WBC 7.7, hemoglobin 10.1, platelet 185.

 

DISCHARGE MEDICATIONS:  aspirin 81 mg p.o. daily, candesartan 4 mg daily, Coreg 
3.125 mg p.o. b.i.d., cetirizine 10 mg daily, Flonase nasal spray daily, 
Antivert 25 mg p.o. q.i.d. p.r.n., Mobic 15 mg p.o. daily p.r.n., Skelaxin 800 
mg p.o. q.i.d. p.r.n., Nystatin topical application b.i.d. p.r.n., Systane 
eyedrops daily, Zocor 40 mg p.o. daily, Effexor XR 37.5 mg daily, Ambien 5 mg 
at bedtime p.r.n., Norco 7.5 one or two tablets q.4 hourly p.r.n., Xarelto 10 
mg p.o. daily.

 

CONTRAINDICATIONS:  None.

 

CODE STATUS:  FULL CODE.

 

INPATIENT CONSULTANTS:  Dr. Galan was primary while in hospital.  [employer] Team 
was consulted for medical management.

 

TEST RESULTS PENDING ON DISCHARGE:  None.

 

ALLERGIES:  AZITHROMYCIN, PENICILLIN.

 

DISCHARGE PLAN:  Post hospital, the patient will follow up with Dr. Galan as 
instructed on 11/26/2018 at 3:15 p.m.  Patient will follow up with primary care 
physician.

 

HOSPITAL COURSE:  The patient was admitted for right total knee replacement by 
Dr. Galan.  The patient has chronic osteoarthritis and failed outpatient 
conservative therapy, required admission for right knee replacement which was 
done on 11/12/2018 without any complication.  Postoperatively, [employer] Team was 
consulted for medical management.  Patient's all medical problems remained 
stable.  We continued all her home medication while in hospital as well as on 
discharge.

 

The patient is planned for discharge today.  Overall, the patient is medically 
stable for discharge.

 

The patient is seen and examined at bedside today.  Please see my progress note 
from today for further detail.

 

TIFFANIE

## 2018-11-15 NOTE — PDOC.PN
- Subjective


Encounter Start Date: 11/15/18


Encounter Start Time: 08:05





Patient seen and examined. No new complaints. No overnight events





- Objective


Resuscitation Status: 


 











Resuscitation Status           FULL:Full Resuscitation














MAR Reviewed: Yes


Vital Signs & Weight: 


 Vital Signs (12 hours)











  Temp Pulse Resp BP Pulse Ox


 


 11/15/18 08:09  98.3 F  85  15  136/76  95


 


 11/15/18 04:00  98.7 F  93  16  144/85 H  95


 


 11/15/18 00:00  98.3 F  91  16  117/72  93 L








 Weight











Admit Weight                   200 lb


 


Weight                         200 lb














I&O: 


 











 11/14/18 11/15/18 11/16/18





 06:59 06:59 06:59


 


Intake Total 2610.5 2100 100


 


Output Total 1700 3075 


 


Balance 910.5 -975 100











Result Diagrams: 


 11/13/18 04:29








Phys Exam





- Physical Examination


Constitutional: NAD


HEENT: PERRLA, moist MMs, sclera anicteric


Neck: no JVD, supple


Respiratory: no wheezing, no rales, no rhonchi


Cardiovascular: RRR, no significant murmur, no rub


Gastrointestinal: soft, non-tender, no distention, positive bowel sounds


Musculoskeletal: no edema, pulses present


Neurological: non-focal, normal sensation, moves all 4 limbs


Lymphatic: no nodes


Psychiatric: normal affect, A&O x 3


Skin: no rash, normal turgor





Dx/Plan


(1) Status post total right knee replacement


Code(s): Z96.651 - PRESENCE OF RIGHT ARTIFICIAL KNEE JOINT   Status: Acute   





(2) Anxiety and depression


Code(s): F41.9 - ANXIETY DISORDER, UNSPECIFIED; F32.9 - MAJOR DEPRESSIVE 

DISORDER, SINGLE EPISODE, UNSPECIFIED   Status: Chronic   





(3) Dyslipidemia


Code(s): E78.5 - HYPERLIPIDEMIA, UNSPECIFIED   Status: Chronic   





(4) Hypertension


Code(s): I10 - ESSENTIAL (PRIMARY) HYPERTENSION   Status: Chronic   





(5) Migraine headache


Code(s): G43.909 - MIGRAINE, UNSP, NOT INTRACTABLE, WITHOUT STATUS MIGRAINOSUS 

  Status: Chronic   





(6) Obesity (BMI 30.0-34.9)


Code(s): E66.9 - OBESITY, UNSPECIFIED   Status: Chronic   





(7) H/O deep venous thrombosis


Code(s): Z86.718 - PERSONAL HISTORY OF OTHER VENOUS THROMBOSIS AND EMBOLISM   

Status: Chronic   





(8) Chronic anticoagulation


Code(s): Z79.01 - LONG TERM (CURRENT) USE OF ANTICOAGULANTS   Status: Chronic   





(9) COPD (chronic obstructive pulmonary disease)


Status: Chronic   





(10) Anemia, normocytic normochromic


Code(s): D64.9 - ANEMIA, UNSPECIFIED   Status: Chronic   





- Plan


cont current plan of care, 





* medication reviewed as below


* symptomatic treatment


* plan for discharge today


* medically stable.








Review of Systems





- Review of Systems


ENT: negative: Ear Pain, Ear Discharge, Nose Pain, Nose Discharge, Nose 

Congestion, Mouth Pain, Mouth Swelling, Throat Pain, Throat Swelling, Other


Respiratory: negative: Cough, Dry, Shortness of Breath, Hemoptysis, SOB with 

Excertion, Pleuritic Pain, Sputum, Wheezing


Cardiovascular: negative: chest pain, palpitations, orthopnea, paroxysmal 

nocturnal dyspnea, edema, light headedness, other


Gastrointestinal: negative: Nausea, Vomiting, Abdominal Pain, Diarrhea, 

Constipation, Melena, Hematochezia, Other


Genitourinary: negative: Dysuria, Frequency, Incontinence, Hematuria, Retention

, Other


Musculoskeletal: negative: Neck Pain, Shoulder Pain, Arm Pain, Back Pain, Hand 

Pain, Leg Pain, Foot Pain, Other


Skin: negative: Rash, Lesions, Carlitos, Bruising, Other





- Medications/Allergies


Allergies/Adverse Reactions: 


 Allergies











Allergy/AdvReac Type Severity Reaction Status Date / Time


 


azithromycin Allergy   Verified 11/06/18 13:34


 


Penicillins Allergy   Verified 11/06/18 13:34











Medications: 


 Current Medications





Acetaminophen (Tylenol)  650 mg PO Q4H PRN


   PRN Reason: HA/ T > 101F; Mild Pain (1-3)


Hydrocodone Bitart/Acetaminophen (Norco 7.5/325)  1 tab PO Q4H PRN


   PRN Reason: Mild Pain (1-3)


Hydrocodone Bitart/Acetaminophen (Norco 7.5/325)  2 tab PO Q4H PRN


   PRN Reason: Moderate Pain (4-6)


   Last Admin: 11/15/18 06:13 Dose:  2 tab


Albuterol/Ipratropium (Duoneb)  3 ml NEB [id number] PRN


   PRN Reason: SOB &/or Wheezing


Artificial Tears (Tears Naturale)  2 drop EA EYE PRN PRN


   PRN Reason: Dry Eyes


Aspirin (Ecotrin)  81 mg PO DAILY [facility]


   Last Admin: 11/15/18 08:35 Dose:  81 mg


Candesartan Cilexetil (Atacand)  4 mg PO DAILY [facility]


   Last Admin: 11/15/18 08:35 Dose:  4 mg


Carvedilol (Coreg)  3.125 mg PO BID [facility]


   Last Admin: 11/15/18 08:35 Dose:  3.125 mg


Clotrimazole (Lotrimin 1% Cream)  0 gm TOP BID PRN


   PRN Reason: Rash/Topical Irritation


Diphenhydramine HCl (Benadryl)  25 mg PO Q6H PRN


   PRN Reason: Itching


Famotidine (Pepcid)  20 mg PO BID [facility]


   Last Admin: 11/15/18 08:34 Dose:  20 mg


Fentanyl (Sublimaze)  50 mcg SLOW IVP Q30MIN PRN


   PRN Reason: Moderate Pain (4-6)


Fentanyl (Sublimaze)  100 mcg SLOW IVP Q1H PRN


   PRN Reason: Severe Pain (7-10)


Ferrous Gluconate (Fergon)  324 mg PO BID [facility]


   Last Admin: 11/15/18 08:31 Dose:  324 mg


Fluticasone Propionate (Flonase Nasal Spray)  0 gm NASAL DAILY [facility]


   Last Admin: 11/15/18 08:30 Dose:  1 spr


Guaifenesin (Robitussin Sf)  200 mg PO Q4H PRN


   PRN Reason: Cough


Hydralazine HCl (Apresoline)  10 mg SLOW IVP Q4H PRN


   PRN Reason: SBP Greater Than 170


Ropivacaine 250 ml/ Device  250 mls @ 10 mls/hr NERVE BLCK INF [facility]


   Last Admin: 11/14/18 14:42 Dose:  250 mls


Sodium Chloride (Normal Saline 0.9%)  1,000 mls @ 100 mls/hr IV .Q10H [facility]


   Last Admin: 11/15/18 08:35 Dose:  Not Given


Iron/Minerals/Multivitamins (Theragran M)  1 tab PO DAILY [facility]


   Last Admin: 11/15/18 08:33 Dose:  1 tab


Loperamide HCl (Imodium)  2 mg PO PRN PRN


   PRN Reason: Diarrhea/Loose Stools


Loratadine (Claritin)  10 mg PO DAILY [facility]


   Last Admin: 11/15/18 08:34 Dose:  Not Given


Meclizine HCl (Antivert)  25 mg PO QID PRN


   PRN Reason: Dizziness


   Last Admin: 11/14/18 17:07 Dose:  25 mg


Mineral Oil/White Petrolatum (Eucerin Cream)  0 gm TOP BIDPRN PRN


   PRN Reason: Dry Skin


Nystatin (Mycostatin Powder)  0 gm TOP BID PRN


   PRN Reason: Rash/Topical Irritation


Ondansetron HCl (Zofran)  4 mg IVP Q6H PRN


   PRN Reason: Nausea/Vomiting


   Last Admin: 11/13/18 14:10 Dose:  4 mg


Promethazine HCl (Phenergan)  12.5 mg IM Q4H PRN


   PRN Reason: Nausea


Promethazine HCl (Phenergan)  12.5 mg SLOW IVP Q4H PRN


   PRN Reason: Nausea/Vomiting


Rivaroxaban (Xarelto)  10 mg PO DAILY [facility]


   Last Admin: 11/15/18 08:31 Dose:  10 mg


Senna/Docusate Sodium (Senokot S)  2 tab PO BID [facility]


   Last Admin: 11/15/18 08:31 Dose:  2 tab


Simvastatin (Zocor)  40 mg PO HS [facility]


   Last Admin: 11/14/18 20:36 Dose:  40 mg


Sodium Chloride (Flush - Normal Saline)  10 ml IVF PRN PRN


   PRN Reason: Saline Flush


   Last Admin: 11/14/18 06:53 Dose:  10 ml


Throat Lozenges (Cepastat Lozenges)  1 raghu PO Q2H PRN


   PRN Reason: Sore Throat


Tramadol HCl (Ultram)  100 mg PO Q6H PRN


   PRN Reason: Moderate to Severe Pain (6-10)


Tramadol HCl (Ultram)  50 mg PO Q6H PRN


   PRN Reason: Mild-Moderate Pain (1-5)


Venlafaxine HCl (Effexor Xr)  37.5 mg PO HS [facility]


   Last Admin: 11/14/18 20:36 Dose:  37.5 mg


Zolpidem Tartrate (Ambien)  5 mg PO HSPRN PRN


   PRN Reason: Insomnia

## 2018-11-29 ENCOUNTER — HOSPITAL ENCOUNTER (OUTPATIENT)
Dept: HOSPITAL 92 - RAD | Age: 78
Discharge: HOME | End: 2018-11-29
Attending: NURSE PRACTITIONER
Payer: MEDICARE

## 2018-11-29 DIAGNOSIS — R07.81: Primary | ICD-10-CM
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
## 2018-11-29 DIAGNOSIS — S22.31XA: ICD-10-CM

## 2018-11-29 NOTE — RAD
HISTORY: 

Fall. Right sided rib pain.

 

AP and oblique views right ribs obtained.

 

Images demonstrate surgical changes seen in the distal right clavicle which is old. There is an acute
 right mildly displaced 9th rib fracture. No associated right sided pneumothorax seen. There is some 
minimal blunting of the right costophrenic angle. This may represent a tiny right sided pleural effus
ion. 

 

IMPRESSION:  

1.      No evidence of right sided pneumothorax. 

2.      Acute right 9th rib fracture. 

 

POS: C

## 2018-11-30 ENCOUNTER — HOSPITAL ENCOUNTER (EMERGENCY)
Dept: HOSPITAL 92 - ERS | Age: 78
Discharge: HOME | End: 2018-11-30
Payer: MEDICARE

## 2018-11-30 DIAGNOSIS — Z79.891: ICD-10-CM
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
## 2018-11-30 DIAGNOSIS — Z79.899: ICD-10-CM

## 2018-11-30 DIAGNOSIS — I50.9: ICD-10-CM

## 2018-11-30 DIAGNOSIS — S01.01XA: Primary | ICD-10-CM

## 2018-11-30 DIAGNOSIS — I11.0: ICD-10-CM

## 2018-11-30 DIAGNOSIS — I25.10: ICD-10-CM

## 2018-11-30 DIAGNOSIS — J44.9: ICD-10-CM

## 2018-11-30 DIAGNOSIS — Z79.82: ICD-10-CM

## 2018-11-30 PROCEDURE — 70450 CT HEAD/BRAIN W/O DYE: CPT

## 2018-11-30 PROCEDURE — 12002 RPR S/N/AX/GEN/TRNK2.6-7.5CM: CPT

## 2018-11-30 NOTE — CT
PRELIMINARY REPORT/[facility]/EMERGENTY AFTER-HOURS PROCEDURE 

 

CT Head Without Intravenous Contrast

 

EXAM DATE/TIME:

11/30/2018 5:59 AM

 

CLINICAL HISTORY:

78 years old, female; Injury or trauma; Fall; Initial encounter; Abrasion; Not specified; Patient HX:
 Presents to the ed via ems with lac to back of head S/P falling episode in the last 2-3 hours. Ems r
eports PT. Is not able to recall events

 

TECHNIQUE:

Axial computed tomography images of the head/brain without intravenous contrast.

 

COMPARISON:

No relevant prior studies available.

 

FINDINGS:

Brain: No acute intracranial hemorrhage or mass effect.

There is mild decreased attenuation in the periventricular white matter, likely from microvascular di
sease. Suspect small old lacunar infarcts in the basal ganglia/internal capsule regions bilaterally. 
No definite acute infarct by CT.

Ventricles: Ventricle size is normal for age.

Bones/joints: No definite acute skull fracture.

Sinuses: Included paranasal sinuses are essentially clear.

Mastoid air cells: No significant acute finding.

Soft tissues: Evidence for soft tissue injury/scalp hematoma/laceration in the high right parietal/oc
cipital region. Some subcutaneous gas in this region.

 

IMPRESSION:

1. No acute intracranial bleed or mass effect.

2. 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

3. Evidence for soft tissue injury/scalp hematoma/laceration in the high right parietal/occipital reg
ion.

4. No definite acute skull fracture.

 

Thank you for allowing us to participate in the care of your patient.

Dictated and Authenticated by: Alirio Castellanos MD

11/30/2018 6:23 AM Central Time ([place] & [place])

 

FINAL REPORT 

CT BRAIN WITHOUT CONTRAST:

 

Date:  11/30/18 

 

FINDINGS/IMPRESSION: 

I agree with the preliminary report given by Val.

 

POS: [facility]

## 2019-04-23 ENCOUNTER — HOSPITAL ENCOUNTER (OUTPATIENT)
Dept: HOSPITAL 92 - BICRAD | Age: 79
Discharge: HOME | End: 2019-04-23
Attending: ANESTHESIOLOGY
Payer: MEDICARE

## 2019-04-23 DIAGNOSIS — M54.17: Primary | ICD-10-CM

## 2019-04-23 DIAGNOSIS — M81.0: ICD-10-CM

## 2019-04-23 DIAGNOSIS — Z98.890: ICD-10-CM

## 2019-04-23 DIAGNOSIS — M41.9: ICD-10-CM

## 2019-04-23 PROCEDURE — 72120 X-RAY BEND ONLY L-S SPINE: CPT
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
## 2019-07-02 ENCOUNTER — HOSPITAL ENCOUNTER (OUTPATIENT)
Dept: HOSPITAL 92 - BICCT | Age: 79
Discharge: HOME | End: 2019-07-02
Attending: FAMILY MEDICINE
Payer: MEDICARE

## 2019-07-02 DIAGNOSIS — G44.309: Primary | ICD-10-CM

## 2019-07-02 PROCEDURE — 70450 CT HEAD/BRAIN W/O DYE: CPT

## 2019-07-02 NOTE — CT
Exam: Head CT without contrast





HISTORY: Posttraumatic headache



COMPARISON: 11/30/2018





FINDINGS:

Hemorrhage: No intraparenchymal hemorrhage or extra-axial hematoma.



Brain parenchyma: Cortical gray-white matter differentiation is preserved. No mass effect or midline 
shift. Basilar cisterns are patent.Stable hypodensities along the right caudate nucleus, right

periventricular white matter as well as the right subinsular white matter. Remote insult to the brain
 parenchyma the region are noted.

Ventricular system: Ventricles and sulci are patent and symmetric.

Calvarium: Intact.

Sinuses and mastoid air cells: Adequate aeration.



IMPRESSION:



1. No acute intracranial process.

2. Age-appropriate atrophy.

3. No significant interval change. Stable hypodensities due to remote insult







Reported By: Yohannes Viramontes 

Electronically Signed:  7/2/2019 9:53 AM